# Patient Record
Sex: FEMALE | URBAN - METROPOLITAN AREA
[De-identification: names, ages, dates, MRNs, and addresses within clinical notes are randomized per-mention and may not be internally consistent; named-entity substitution may affect disease eponyms.]

---

## 2021-01-27 ENCOUNTER — HOSPITAL ENCOUNTER (OUTPATIENT)
Dept: LAB | Age: 46
Discharge: HOME | End: 2021-01-27

## 2021-01-27 PROCEDURE — 88305 TISSUE EXAM BY PATHOLOGIST: CPT

## 2023-10-23 ENCOUNTER — OFFICE VISIT (OUTPATIENT)
Dept: ORTHOPEDIC SURGERY | Age: 48
End: 2023-10-23
Payer: COMMERCIAL

## 2023-10-23 VITALS — BODY MASS INDEX: 23.73 KG/M2 | HEIGHT: 64 IN | WEIGHT: 139 LBS

## 2023-10-23 DIAGNOSIS — M75.21 BICIPITAL TENDINITIS OF RIGHT SHOULDER: ICD-10-CM

## 2023-10-23 DIAGNOSIS — M24.011 LOOSE BODY IN RIGHT SHOULDER: ICD-10-CM

## 2023-10-23 DIAGNOSIS — M19.011 OSTEOARTHRITIS OF RIGHT GLENOHUMERAL JOINT: Primary | ICD-10-CM

## 2023-10-23 DIAGNOSIS — S43.431A SUPERIOR GLENOID LABRUM LESION OF RIGHT SHOULDER, INITIAL ENCOUNTER: ICD-10-CM

## 2023-10-23 DIAGNOSIS — M19.011 DEGENERATIVE JOINT DISEASE OF RIGHT ACROMIOCLAVICULAR JOINT: ICD-10-CM

## 2023-10-23 PROCEDURE — 99204 OFFICE O/P NEW MOD 45 MIN: CPT | Performed by: ORTHOPAEDIC SURGERY

## 2023-10-23 RX ORDER — THYROID 30 MG/1
30 TABLET ORAL DAILY
COMMUNITY
Start: 2023-10-03

## 2023-10-23 NOTE — PROGRESS NOTES
Degener[facility] with increased signal seen on the T2 weighted images. No evident rotator cuff tear. There is fluid around the biceps tendon and labral degeneration generally without a paralabral cyst.  There is a shoulder effusion. There are degenerative changes in the glenohumeral joint. There is a loose body in the subcoracoid/subscapularis recess. Impression:   1. Osteoarthritis of right glenohumeral joint    2. Superior glenoid labrum lesion of right shoulder, initial encounter    3. Bicipital tendinitis of right shoulder    4. Loose body in right shoulder    5. Degenerative joint disease of right acromioclavicular joint       Left shoulder pain  Hypothyroidism    Plan:   I discussed the problem with the patient. I discussed nonoperative versus operative intervention including injections. Specifically today I discussed the treatment of early glenohumeral osteoarthritis in a young active patient. I discussed the potential need for comprehensive arthroscopic management of the right shoulder and ultimately potentially a right total shoulder arthroplasty. We will defer surgery for now. The patient has been in physical therapy. We had a lengthy discussion regarding a right shoulder subacromial injection. I believe that would really help her. I would recommend a right shoulder subacromial injection prior to considering operative intervention. I would put her back in supervised physical therapy after a right shoulder injection. We had a lengthy discussion. The patient would like to consider her options. We had a very lengthy discussion. She will advise us on how she wants to proceed. Otherwise I will recheck her back on an as-needed basis  4 This is a chronic illness/condition with exacerbation and progression    Follow up: Return if symptoms worsen or fail to improve.              Chris Tellez MD

## 2023-10-26 ENCOUNTER — TELEPHONE (OUTPATIENT)
Dept: ORTHOPEDIC SURGERY | Age: 48
End: 2023-10-26

## 2023-10-26 NOTE — TELEPHONE ENCOUNTER
Call pt  she  wants  to  ask if  there is a  medication that she can take  instead of  a  cortisone injection     And  also  she  wants  to know  if  Dr Soto Mcgee  can  speak  with  her  physcial  therapist

## 2023-10-30 NOTE — TELEPHONE ENCOUNTER
Called and spoke with the patient, informed patient a PT referral sent to [address] [place] [address] location, willa will be able to send progress notes to our office for Dr. Freya Espinosa to review to see her progress.

## 2023-11-08 ENCOUNTER — TELEPHONE (OUTPATIENT)
Dept: ORTHOPEDIC SURGERY | Age: 48
End: 2023-11-08

## 2023-11-08 NOTE — TELEPHONE ENCOUNTER
Pt  has  decided  she  does  want  a shoulder  injection    Next  week  she  is  pretty  open.   Monday  she  is free  tues after 10:30    Free on  Wednesday

## 2023-11-09 ENCOUNTER — TELEPHONE (OUTPATIENT)
Dept: ORTHOPEDIC SURGERY | Age: 48
End: 2023-11-09

## 2023-11-09 NOTE — TELEPHONE ENCOUNTER
I called and spoke to pt who had several questions about injection process. Pt also wanted to know about the timing of her injection. Pt will be out of town for Thanksgiving and is concerned about delaying therapy after injection. Will speak with AGP and call her back.

## 2023-11-20 ENCOUNTER — OFFICE VISIT (OUTPATIENT)
Dept: ORTHOPEDIC SURGERY | Age: 48
End: 2023-11-20
Payer: COMMERCIAL

## 2023-11-20 DIAGNOSIS — M24.011 LOOSE BODY IN RIGHT SHOULDER: ICD-10-CM

## 2023-11-20 DIAGNOSIS — S43.431A SUPERIOR GLENOID LABRUM LESION OF RIGHT SHOULDER, INITIAL ENCOUNTER: ICD-10-CM

## 2023-11-20 DIAGNOSIS — M19.011 OSTEOARTHRITIS OF RIGHT GLENOHUMERAL JOINT: Primary | ICD-10-CM

## 2023-11-20 DIAGNOSIS — M19.011 DEGENERATIVE JOINT DISEASE OF RIGHT ACROMIOCLAVICULAR JOINT: ICD-10-CM

## 2023-11-20 DIAGNOSIS — M75.21 BICIPITAL TENDINITIS OF RIGHT SHOULDER: ICD-10-CM

## 2023-11-20 PROCEDURE — 20610 DRAIN/INJ JOINT/BURSA W/O US: CPT | Performed by: ORTHOPAEDIC SURGERY

## 2023-11-20 RX ORDER — METHYLPREDNISOLONE ACETATE 80 MG/ML
80 INJECTION, SUSPENSION INTRA-ARTICULAR; INTRALESIONAL; INTRAMUSCULAR; SOFT TISSUE ONCE
Status: COMPLETED | OUTPATIENT
Start: 2023-11-20 | End: 2023-11-20

## 2023-11-20 RX ADMIN — METHYLPREDNISOLONE ACETATE 80 MG: 80 INJECTION, SUSPENSION INTRA-ARTICULAR; INTRALESIONAL; INTRAMUSCULAR; SOFT TISSUE at 09:12

## 2023-11-20 NOTE — PROGRESS NOTES
cuff tear. There is fluid around the biceps tendon and labral degeneration generally without a paralabral cyst.  There is a shoulder effusion. There are degenerative changes in the glenohumeral joint. There is a loose body in the subcoracoid/subscapularis recess. Minor procedure:      OFFICE PROCEDURE PROGRESS NOTE    Chart reviewed for the following:   Tahir Keene MD, have reviewed the History, Physical and updated the Allergic reactions for Laisha Durán performed immediately prior to start of procedure:   Tahir Keene MD, have performed the following reviews on [address] [place] [address] prior to the start of the procedure:            * Patient was identified by name and date of birth   * Agreement on procedure being performed was verified  * Risks and Benefits explained to the patient  * Procedure site verified and marked as necessary  * Patient was positioned for comfort     Time: 7:59 AM  Date of procedure: 11/20/2023  Procedure performed by:  Lorena Vargas MD    After sterile prep and drape of the right shoulder, the patient received a 9:1 injection into the subacromial space. It consisted of 4.5 mL of 2% Xylocaine, 4.5 mL of 0.5% bupivacaine and 80 mg of Depo-Medrol. A sterile dressing was applied. The patient tolerated the procedure well. Impression:   1. Osteoarthritis of right glenohumeral joint    2. Superior glenoid labrum lesion of right shoulder, initial encounter    3. Bicipital tendinitis of right shoulder    4. Loose body in right shoulder    5. Degenerative joint disease of right acromioclavicular joint       Left shoulder pain  Hypothyroidism    Plan:   I discussed the plan with the patient. We will continue physical therapy working on full motion and full strength. I will recheck her back in 2 months      Follow up: Return in about 2 months (around 1/20/2024).              Lorena Vargas MD

## 2023-11-24 NOTE — PROGRESS NOTES
Excessive fatigue, snoring. Rule out sleep disordered breathing. DDx include depression, narcolepsy but not suspicious of this. PLAN:  Home sleep test  Follow up after study       Orders Placed This Encounter   Procedures    Ambulatory Referral to Sleep Studies     Referral Priority:   Routine     Referral Type:   Consult for Advice and Opinion     Referral Reason:   Specialty Services Required     Number of Visits Requested:   1     No orders of the defined types were placed in this encounter. Collaborating Physician: Dr. Brian Castillo I spent at least 32 minutes with this established patient, and >50% of the time was spent counseling and/or coordinating care regarding LETICIA.     SALO Bradford CNP  Electronically signed

## 2023-11-27 ENCOUNTER — TELEMEDICINE (OUTPATIENT)
Dept: SLEEP MEDICINE | Age: 48
End: 2023-11-27
Payer: COMMERCIAL

## 2023-11-27 DIAGNOSIS — R06.83 SNORING: Primary | ICD-10-CM

## 2023-11-27 DIAGNOSIS — G47.8 NON-RESTORATIVE SLEEP: ICD-10-CM

## 2023-11-27 DIAGNOSIS — G47.10 HYPERSOMNIA: ICD-10-CM

## 2023-11-27 PROCEDURE — 99203 OFFICE O/P NEW LOW 30 MIN: CPT | Performed by: NURSE PRACTITIONER

## 2023-11-27 ASSESSMENT — SLEEP AND FATIGUE QUESTIONNAIRES
ESS TOTAL SCORE: 10
HOW LIKELY ARE YOU TO NOD OFF OR FALL ASLEEP WHEN YOU ARE A PASSENGER IN A CAR FOR AN HOUR WITHOUT A BREAK: 1
HOW LIKELY ARE YOU TO NOD OFF OR FALL ASLEEP WHILE SITTING QUIETLY AFTER LUNCH WITHOUT ALCOHOL: 2
HOW LIKELY ARE YOU TO NOD OFF OR FALL ASLEEP WHILE SITTING AND READING: 1
HOW LIKELY ARE YOU TO NOD OFF OR FALL ASLEEP WHILE WATCHING TV: 2
HOW LIKELY ARE YOU TO NOD OFF OR FALL ASLEEP WHILE SITTING INACTIVE IN A PUBLIC PLACE: 0
HOW LIKELY ARE YOU TO NOD OFF OR FALL ASLEEP IN A CAR, WHILE STOPPED FOR A FEW MINUTES IN TRAFFIC: 0
HOW LIKELY ARE YOU TO NOD OFF OR FALL ASLEEP WHILE LYING DOWN TO REST IN THE AFTERNOON WHEN CIRCUMSTANCES PERMIT: 3
HOW LIKELY ARE YOU TO NOD OFF OR FALL ASLEEP WHILE SITTING AND TALKING TO SOMEONE: 1

## 2023-11-27 NOTE — PATIENT INSTRUCTIONS
You will get a call from [facility] to schedule your at-home sleep study in the next 24-48 hours. This device will be mailed to you. It is a 2 night sleep study and once completed, you will return device to address provided. A physician will read the study and you will receive a call from our office with results or to schedule a follow-up appointment with the Sleep Center to discuss treatment options.

## 2023-12-20 ENCOUNTER — HOSPITAL ENCOUNTER (OUTPATIENT)
Dept: SLEEP CENTER | Age: 48
Discharge: HOME | End: 2023-12-23

## 2023-12-26 ENCOUNTER — TELEPHONE (OUTPATIENT)
Dept: SLEEP MEDICINE | Age: 48
End: 2023-12-26

## 2024-01-08 NOTE — PROGRESS NOTES
[facility]  [address]  [place] [address]  [phone]    Patient Name:  Laisha Garcia  YOB: 1975      Office Visit 1/9/2024    CHIEF COMPLAINT:    Chief Complaint   Patient presents with    New Patient    Sleep Study         HISTORY OF PRESENT ILLNESS:  Patient is a 49 yo  female seen today for follow up of sleep study results. Patient's HST revealed AHI of 5.4 including 5 central apneas, 11 obstructive apneas and 18 hypopneas. Lowest desaturation seen was 82% with SpO2 less than 89% for 1.3 minutes of the study.     Sleep study results reviewed with patient as well as pathophysiology and treatment options of sleep apnea.  She states she did wake up frequently during HST and had issues with device so likely her sleep study is underscored.  She is not rested in the mornings, does have daytime fatigue and will nap on occasion.  Current [place] score is 15/24.  She is falling asleep easily and denies any frequent nocturnal awakenings.  Of note she does have 2 sons who were diagnosed in their 20s with sleep apnea and have noticed significant improvement on CPAP.  She has also mentioned difficulty staying awake while driving.  I did confirm she is taking her L-tryptophan nightly rather than in the daytime.  Patient is agreeable to starting CPAP.    HST- 12/6/23                History reviewed. No pertinent past medical history.      There is no problem list on file for this patient.         History reviewed. No pertinent surgical history.        Social History     Socioeconomic History    Marital status:      Spouse name: Not on file    Number of children: Not on file    Years of education: Not on file    Highest education level: Not on file   Occupational History    Not on file   Tobacco Use    Smoking status: Never    Smokeless tobacco: Never   Substance and Sexual Activity    Alcohol use: Not on file    Drug use: Not on file    Sexual activity: Not

## 2024-01-09 ENCOUNTER — OFFICE VISIT (OUTPATIENT)
Dept: SLEEP MEDICINE | Age: 49
End: 2024-01-09
Payer: COMMERCIAL

## 2024-01-09 VITALS
HEIGHT: 64 IN | RESPIRATION RATE: 13 BRPM | BODY MASS INDEX: 24.48 KG/M2 | HEART RATE: 89 BPM | DIASTOLIC BLOOD PRESSURE: 76 MMHG | OXYGEN SATURATION: 99 % | WEIGHT: 143.4 LBS | SYSTOLIC BLOOD PRESSURE: 118 MMHG

## 2024-01-09 DIAGNOSIS — G47.8 NON-RESTORATIVE SLEEP: ICD-10-CM

## 2024-01-09 DIAGNOSIS — G47.33 OSA (OBSTRUCTIVE SLEEP APNEA): Primary | ICD-10-CM

## 2024-01-09 DIAGNOSIS — G47.34 NOCTURNAL HYPOXEMIA: ICD-10-CM

## 2024-01-09 DIAGNOSIS — G47.10 HYPERSOMNIA: ICD-10-CM

## 2024-01-09 PROCEDURE — 99214 OFFICE O/P EST MOD 30 MIN: CPT | Performed by: STUDENT IN AN ORGANIZED HEALTH CARE EDUCATION/TRAINING PROGRAM

## 2024-01-09 RX ORDER — OMEGA-3 FATTY ACIDS/FISH OIL 300-1000MG
CAPSULE ORAL
COMMUNITY

## 2024-01-09 ASSESSMENT — SLEEP AND FATIGUE QUESTIONNAIRES
ESS TOTAL SCORE: 15
HOW LIKELY ARE YOU TO NOD OFF OR FALL ASLEEP WHILE SITTING AND TALKING TO SOMEONE: 0
HOW LIKELY ARE YOU TO NOD OFF OR FALL ASLEEP WHILE SITTING INACTIVE IN A PUBLIC PLACE: 2
HOW LIKELY ARE YOU TO NOD OFF OR FALL ASLEEP IN A CAR, WHILE STOPPED FOR A FEW MINUTES IN TRAFFIC: 3
HOW LIKELY ARE YOU TO NOD OFF OR FALL ASLEEP WHEN YOU ARE A PASSENGER IN A CAR FOR AN HOUR WITHOUT A BREAK: 3
HOW LIKELY ARE YOU TO NOD OFF OR FALL ASLEEP WHILE SITTING AND READING: 3
HOW LIKELY ARE YOU TO NOD OFF OR FALL ASLEEP WHILE WATCHING TV: 1
HOW LIKELY ARE YOU TO NOD OFF OR FALL ASLEEP WHILE SITTING QUIETLY AFTER LUNCH WITHOUT ALCOHOL: 0
HOW LIKELY ARE YOU TO NOD OFF OR FALL ASLEEP WHILE LYING DOWN TO REST IN THE AFTERNOON WHEN CIRCUMSTANCES PERMIT: 3

## 2024-01-09 NOTE — PATIENT INSTRUCTIONS
The company who will be taking care of your CPAP supplies is:    Address: [address], [place] [address]  Phone: [phone] Option #1  Fax: [phone]     Patient Education        Sleep Apnea: Care Instructions  Overview     Sleep apnea means that you frequently stop breathing for 10 seconds or longer during sleep. It can be mild to severe, based on the number of times an hour that you stop breathing.  Blocked or narrowed airways in your nose, mouth, or throat can cause sleep apnea. Your airway can become blocked when your throat muscles and tongue relax during sleep.  You can help treat sleep apnea at home by making lifestyle changes. You also can use a CPAP breathing machine that keeps tissues in the throat from blocking your airway. You may use a mouth or nose device while you sleep to help keep your airway open. Surgery may be an option for some people. Surgery may be done to implant a nerve stimulation device in the chest that helps keep the airway open. Surgery can also be done to remove enlarged tissues that are blocking the throat.  Follow-up care is a key part of your treatment and safety. Be sure to make and go to all appointments, and call your doctor if you are having problems. It's also a good idea to know your test results and keep a list of the medicines you take.  How can you care for yourself at home?  Lose weight, if needed.  Sleep on your side. It may help mild apnea.  Avoid alcohol and medicines such as sleeping pills, opioids, or sedatives before bed.  Don't smoke. If you need help quitting, talk to your doctor.  Prop up the head of your bed.  Treat breathing problems, such as a stuffy nose, that are caused by a cold or allergies.  Try a continuous positive airway pressure (CPAP) breathing machine if your doctor recommends it.  If CPAP doesn't work for you, ask your doctor if you can try other masks, settings, or breathing machines.  Try oral breathing devices or other nasal

## 2024-01-23 ENCOUNTER — OFFICE VISIT (OUTPATIENT)
Dept: ORTHOPEDIC SURGERY | Age: 49
End: 2024-01-23
Payer: COMMERCIAL

## 2024-01-23 DIAGNOSIS — S43.431A SUPERIOR GLENOID LABRUM LESION OF RIGHT SHOULDER, INITIAL ENCOUNTER: ICD-10-CM

## 2024-01-23 DIAGNOSIS — M75.21 BICIPITAL TENDINITIS OF RIGHT SHOULDER: ICD-10-CM

## 2024-01-23 DIAGNOSIS — M24.011 LOOSE BODY IN RIGHT SHOULDER: ICD-10-CM

## 2024-01-23 DIAGNOSIS — M19.011 DEGENERATIVE JOINT DISEASE OF RIGHT ACROMIOCLAVICULAR JOINT: ICD-10-CM

## 2024-01-23 DIAGNOSIS — M19.011 OSTEOARTHRITIS OF RIGHT GLENOHUMERAL JOINT: Primary | ICD-10-CM

## 2024-01-23 PROCEDURE — 99212 OFFICE O/P EST SF 10 MIN: CPT | Performed by: ORTHOPAEDIC SURGERY

## 2024-01-23 NOTE — PROGRESS NOTES
Name: Laisha Garcia  YOB: 1975  Gender: female  MRN: [id number]          HPI: Laisha Garcia is a 48 y.o. right-hand-dominant female seen at the request of Galen Romo PT right shoulder problems.  She has a history of hypothyroidism.  She notes a long history of bilateral shoulder pain.  She describes what appears to be evidence of instability with possible subluxation.  She was always able to accommodate.  The left shoulder does not bother her too much.  In early July she was pushing herself up in bed when she felt a pop and the onset of right shoulder pain.  She initially had x-[facility] at [facility].  She for an MRI of the right shoulder on 9/25/2023.  She saw Dr. Joe Avalos on 10/4/2023.  Dr. Avalos suggested possible right shoulder surgery.  She is here for second opinion.  She denies any numbness or tingling.  She denies any definitive dislocation.  The left shoulder is not too bad.  The right shoulder is sore painful and stiff.  She is sleeping at night.  She has been in physical therapy.  I injected her right shoulder on the last visit.  She is doing much better.  She has minimal discomfort at end range of motion    ROS/Meds/PSH/PMH/FH/SH: A ten system review of systems was performed and is negative other than what is in the HPI.   Tobacco:  reports that she has never smoked. She has never used smokeless tobacco.  There were no vitals taken for this visit.     Physical Examination:  She is an awake alert pleasant female ambulating without difficulty  She has a full range of cervical spine motion without radicular findings    The left shoulder has 0 to 180 degrees of active and 0 to 180 degrees passive forward elevation.   Mild overhead pain  Internal rotation is to T6.  External rotation is to 60 degrees at the side.   In the 90 degree abducted position 90 degrees of external and 90 degrees internal rotation  The AC joint is non-tender  SC joint is non-tender.

## 2024-04-01 NOTE — PROGRESS NOTES
[facility]  [address]  [place] [address]  [phone]    Patient Name:  Laisha Garcia  YOB: 1975      Office Visit 4/4/2024    CHIEF COMPLAINT:    Chief Complaint   Patient presents with    Sleep Apnea         HISTORY OF PRESENT ILLNESS:  Patient is a 47 yo  female seen today for follow up of sleep apnea. Patient's sleep study in December 2023 revealed AHI of 5.4 with lowest desaturation 82%. She is prescribed cpap therapy with a humidifier set at 6-10cm with a full face mask. Most recent download reveals AHI on PAP therapy is 2.0, leak is 15.6 and the hourly usage is 6 hours 15 minutes nightly. The overall use is 437 hours with days greater than four hours at 66/73. The patient is compliant with the Pap therapy and is feeling better as a result.      This is patient's first visit since starting CPAP.  She is falling asleep easily and denies any frequent nocturnal awakenings.  She is somewhat rested in the morning, does still have some daytime fatigue but is not napping as often.  Current [place] score is 12/24.  She states recently she was on a 4-hour drive late at night and was able to stay awake.  She states later that evening she did fall asleep without CPAP and the following day had excessive fatigue.  She also mentions that in her teens/20s she had excessive fatigue and she is concerned about potential narcolepsy.  She denies any cataplexy symptoms.  We did discuss that if she is still having some hypersomnia we could discuss starting a stimulant however if she wishes to pursue a narcolepsy diagnosis we will need to change her pressure to a set pressure for 30 days prior to ordering the study.    Download        [place] Sleepiness Scale      4/4/2024     1:05 PM 1/9/2024     9:11 AM 11/27/2023     8:39 AM   Sleep Medicine   Sitting and reading 2 3 1   Watching TV 1 1 2   Sitting, inactive in a public place (e.g. a theatre or a meeting) 0 2 0   As a

## 2024-04-04 ENCOUNTER — OFFICE VISIT (OUTPATIENT)
Dept: SLEEP MEDICINE | Age: 49
End: 2024-04-04
Payer: COMMERCIAL

## 2024-04-04 VITALS
RESPIRATION RATE: 17 BRPM | SYSTOLIC BLOOD PRESSURE: 112 MMHG | OXYGEN SATURATION: 100 % | BODY MASS INDEX: 24.41 KG/M2 | DIASTOLIC BLOOD PRESSURE: 72 MMHG | WEIGHT: 143 LBS | HEIGHT: 64 IN | HEART RATE: 84 BPM

## 2024-04-04 DIAGNOSIS — G47.33 OSA (OBSTRUCTIVE SLEEP APNEA): Primary | ICD-10-CM

## 2024-04-04 DIAGNOSIS — G47.34 NOCTURNAL HYPOXEMIA: ICD-10-CM

## 2024-04-04 DIAGNOSIS — G47.8 NON-RESTORATIVE SLEEP: ICD-10-CM

## 2024-04-04 DIAGNOSIS — G47.10 HYPERSOMNIA: ICD-10-CM

## 2024-04-04 PROCEDURE — 99213 OFFICE O/P EST LOW 20 MIN: CPT | Performed by: STUDENT IN AN ORGANIZED HEALTH CARE EDUCATION/TRAINING PROGRAM

## 2024-04-04 ASSESSMENT — SLEEP AND FATIGUE QUESTIONNAIRES
HOW LIKELY ARE YOU TO NOD OFF OR FALL ASLEEP WHILE SITTING AND TALKING TO SOMEONE: SLIGHT CHANCE OF DOZING
HOW LIKELY ARE YOU TO NOD OFF OR FALL ASLEEP WHILE SITTING QUIETLY AFTER LUNCH WITHOUT ALCOHOL: MODERATE CHANCE OF DOZING
HOW LIKELY ARE YOU TO NOD OFF OR FALL ASLEEP WHEN YOU ARE A PASSENGER IN A CAR FOR AN HOUR WITHOUT A BREAK: MODERATE CHANCE OF DOZING
HOW LIKELY ARE YOU TO NOD OFF OR FALL ASLEEP WHILE LYING DOWN TO REST IN THE AFTERNOON WHEN CIRCUMSTANCES PERMIT: HIGH CHANCE OF DOZING
HOW LIKELY ARE YOU TO NOD OFF OR FALL ASLEEP IN A CAR, WHILE STOPPED FOR A FEW MINUTES IN TRAFFIC: SLIGHT CHANCE OF DOZING
ESS TOTAL SCORE: 12
HOW LIKELY ARE YOU TO NOD OFF OR FALL ASLEEP WHILE SITTING INACTIVE IN A PUBLIC PLACE: WOULD NEVER DOZE
HOW LIKELY ARE YOU TO NOD OFF OR FALL ASLEEP WHILE SITTING AND READING: MODERATE CHANCE OF DOZING
HOW LIKELY ARE YOU TO NOD OFF OR FALL ASLEEP WHILE WATCHING TV: SLIGHT CHANCE OF DOZING

## 2024-10-15 NOTE — PROGRESS NOTES
[facility]  [address]  [place] [address]  [phone]    Patient Name:  Laisha Garcia  YOB: 1975    Laisha Garcia, was evaluated through a synchronous (real-time) audio-video encounter. The patient (or guardian if applicable) is aware that this is a billable service, which includes applicable co-pays. This Virtual Visit was conducted with patient's (and/or legal guardian's) consent. Patient identification was verified, and a caregiver was present when appropriate.   The patient was located at Other: in [place], [place]   Provider was located at Home (Appt Dept State): [place]  Confirm you are appropriately licensed, registered, or certified to deliver care in the state where the patient is located as indicated above. If you are not or unsure, please re-schedule the visit: Yes, I confirm.          --SALO Mahoney - CNP on 10/17/2024 at 10:23 AM             Office Visit 10/17/2024    CHIEF COMPLAINT:    Chief Complaint   Patient presents with    CPAP/BiPAP    Sleep Apnea       HISTORY OF PRESENT ILLNESS:  Patient is being seen today via virtual visit.     Patient had a home sleep study 12/6/2023 with AHI 5.4 events per hour with desaturations to 82%. She is prescribed auto CPAP 6 to 10 cm.  Download reveals 90% compliance over the last 194 days with average nightly use 6 hours and 16 minutes.  AHI is 1.9 events per hour with average pressure use 7.6 to 9.8 cm.    She states that for the past few weeks, she has had a harder time to get going in the mornings but unsure if its related to cpap. She also was out of power for a week and was unable to use her machine.     She is using a full face mask and tolerates it better than the nasal. She tried the nasal but kept opening her mouth. She denies any problems with pressure.  She denies any significant medical changes. No LE edema edema. No weight changes. She states that usually she is not sleepy during the

## 2024-10-16 ASSESSMENT — SLEEP AND FATIGUE QUESTIONNAIRES
HOW LIKELY ARE YOU TO NOD OFF OR FALL ASLEEP WHILE SITTING INACTIVE IN A PUBLIC PLACE: SLIGHT CHANCE OF DOZING
HOW LIKELY ARE YOU TO NOD OFF OR FALL ASLEEP WHILE LYING DOWN TO REST IN THE AFTERNOON WHEN CIRCUMSTANCES PERMIT: HIGH CHANCE OF DOZING
HOW LIKELY ARE YOU TO NOD OFF OR FALL ASLEEP WHILE SITTING AND READING: MODERATE CHANCE OF DOZING
HOW LIKELY ARE YOU TO NOD OFF OR FALL ASLEEP IN A CAR, WHILE STOPPED FOR A FEW MINUTES IN TRAFFIC: SLIGHT CHANCE OF DOZING
HOW LIKELY ARE YOU TO NOD OFF OR FALL ASLEEP WHEN YOU ARE A PASSENGER IN A CAR FOR AN HOUR WITHOUT A BREAK: MODERATE CHANCE OF DOZING
HOW LIKELY ARE YOU TO NOD OFF OR FALL ASLEEP WHILE SITTING AND READING: MODERATE CHANCE OF DOZING
HOW LIKELY ARE YOU TO NOD OFF OR FALL ASLEEP WHILE SITTING AND TALKING TO SOMEONE: SLIGHT CHANCE OF DOZING
ESS TOTAL SCORE: 13
HOW LIKELY ARE YOU TO NOD OFF OR FALL ASLEEP WHILE WATCHING TV: SLIGHT CHANCE OF DOZING
HOW LIKELY ARE YOU TO NOD OFF OR FALL ASLEEP IN A CAR, WHILE STOPPED FOR A FEW MINUTES IN TRAFFIC: SLIGHT CHANCE OF DOZING
HOW LIKELY ARE YOU TO NOD OFF OR FALL ASLEEP WHILE SITTING QUIETLY AFTER LUNCH WITHOUT ALCOHOL: MODERATE CHANCE OF DOZING
HOW LIKELY ARE YOU TO NOD OFF OR FALL ASLEEP WHILE LYING DOWN TO REST IN THE AFTERNOON WHEN CIRCUMSTANCES PERMIT: HIGH CHANCE OF DOZING
HOW LIKELY ARE YOU TO NOD OFF OR FALL ASLEEP WHILE WATCHING TV: SLIGHT CHANCE OF DOZING
HOW LIKELY ARE YOU TO NOD OFF OR FALL ASLEEP WHILE SITTING INACTIVE IN A PUBLIC PLACE: SLIGHT CHANCE OF DOZING
HOW LIKELY ARE YOU TO NOD OFF OR FALL ASLEEP WHILE SITTING AND TALKING TO SOMEONE: SLIGHT CHANCE OF DOZING
HOW LIKELY ARE YOU TO NOD OFF OR FALL ASLEEP WHILE SITTING QUIETLY AFTER LUNCH WITHOUT ALCOHOL: MODERATE CHANCE OF DOZING
HOW LIKELY ARE YOU TO NOD OFF OR FALL ASLEEP WHEN YOU ARE A PASSENGER IN A CAR FOR AN HOUR WITHOUT A BREAK: MODERATE CHANCE OF DOZING

## 2024-10-17 ENCOUNTER — TELEMEDICINE (OUTPATIENT)
Dept: SLEEP MEDICINE | Age: 49
End: 2024-10-17
Payer: COMMERCIAL

## 2024-10-17 DIAGNOSIS — G47.33 OSA (OBSTRUCTIVE SLEEP APNEA): Primary | ICD-10-CM

## 2024-10-17 DIAGNOSIS — G47.34 NOCTURNAL HYPOXEMIA: ICD-10-CM

## 2024-10-17 DIAGNOSIS — G47.10 HYPERSOMNIA: ICD-10-CM

## 2024-10-17 PROCEDURE — 99213 OFFICE O/P EST LOW 20 MIN: CPT | Performed by: NURSE PRACTITIONER

## 2025-02-26 ENCOUNTER — TELEPHONE (OUTPATIENT)
Dept: SLEEP MEDICINE | Age: 50
End: 2025-02-26

## 2025-02-26 NOTE — TELEPHONE ENCOUNTER
Patient called and stated she needed a copy of her cpap order. I sent patient a xTV message with her order and called her to let her know its been sent.

## 2025-04-23 ASSESSMENT — SLEEP AND FATIGUE QUESTIONNAIRES
HOW LIKELY ARE YOU TO NOD OFF OR FALL ASLEEP WHILE LYING DOWN TO REST IN THE AFTERNOON WHEN CIRCUMSTANCES PERMIT: HIGH CHANCE OF DOZING
ESS TOTAL SCORE: 10
HOW LIKELY ARE YOU TO NOD OFF OR FALL ASLEEP WHILE LYING DOWN TO REST IN THE AFTERNOON WHEN CIRCUMSTANCES PERMIT: HIGH CHANCE OF DOZING
HOW LIKELY ARE YOU TO NOD OFF OR FALL ASLEEP WHILE WATCHING TV: SLIGHT CHANCE OF DOZING
HOW LIKELY ARE YOU TO NOD OFF OR FALL ASLEEP WHILE SITTING INACTIVE IN A PUBLIC PLACE: SLIGHT CHANCE OF DOZING
HOW LIKELY ARE YOU TO NOD OFF OR FALL ASLEEP IN A CAR, WHILE STOPPED FOR A FEW MINUTES IN TRAFFIC: SLIGHT CHANCE OF DOZING
HOW LIKELY ARE YOU TO NOD OFF OR FALL ASLEEP WHILE SITTING QUIETLY AFTER LUNCH WITHOUT ALCOHOL: SLIGHT CHANCE OF DOZING
HOW LIKELY ARE YOU TO NOD OFF OR FALL ASLEEP WHILE SITTING QUIETLY AFTER LUNCH WITHOUT ALCOHOL: SLIGHT CHANCE OF DOZING
HOW LIKELY ARE YOU TO NOD OFF OR FALL ASLEEP WHILE SITTING AND TALKING TO SOMEONE: WOULD NEVER DOZE
HOW LIKELY ARE YOU TO NOD OFF OR FALL ASLEEP WHILE SITTING AND READING: MODERATE CHANCE OF DOZING
HOW LIKELY ARE YOU TO NOD OFF OR FALL ASLEEP WHEN YOU ARE A PASSENGER IN A CAR FOR AN HOUR WITHOUT A BREAK: SLIGHT CHANCE OF DOZING
HOW LIKELY ARE YOU TO NOD OFF OR FALL ASLEEP IN A CAR, WHILE STOPPED FOR A FEW MINUTES IN TRAFFIC: SLIGHT CHANCE OF DOZING
HOW LIKELY ARE YOU TO NOD OFF OR FALL ASLEEP WHILE SITTING AND TALKING TO SOMEONE: WOULD NEVER DOZE
HOW LIKELY ARE YOU TO NOD OFF OR FALL ASLEEP WHILE WATCHING TV: SLIGHT CHANCE OF DOZING
HOW LIKELY ARE YOU TO NOD OFF OR FALL ASLEEP WHEN YOU ARE A PASSENGER IN A CAR FOR AN HOUR WITHOUT A BREAK: SLIGHT CHANCE OF DOZING
HOW LIKELY ARE YOU TO NOD OFF OR FALL ASLEEP WHILE SITTING AND READING: MODERATE CHANCE OF DOZING
HOW LIKELY ARE YOU TO NOD OFF OR FALL ASLEEP WHILE SITTING INACTIVE IN A PUBLIC PLACE: SLIGHT CHANCE OF DOZING

## 2025-04-23 NOTE — PROGRESS NOTES
Constanza  Change pressure to apap 6-9 cm    [facility]  Phone: [address]  [facility] [address]  Dept: [phone]      Patient Name: Laisha Garcia  : 1975  Gender: female  Address: [address] [place] [address]  Patient phone: [phone] (home)       Primary Insurance: Payor: [facility] / Plan: [facility] STATE / Product Type: *No Product type* /   Subscriber ID: [id number] - ([facility])      AMB Supply Order  Order Details     DME Location:   Order Date: 2025   The primary encounter diagnosis was LETICIA (obstructive sleep apnea). Diagnoses of Nocturnal hypoxemia and Hypersomnia were also pertinent to this visit.             (  X   )Supplies Needed       apap Machine   (     ) CPAP Unit  (     ) Auto CPAP Unit  (     ) BiLevel Unit  (     ) Auto BiLevel Unit  (     ) ASV   (     ) Bilevel ST      Length of need: 12 months    Pressure:  6-9 cmH20  EPR:      Starting Ramp Pressure:   cm H20  Ramp Time: min      Patient had a diagnostic Apnea Hypopnea Index (AHI) of :    *SUPPLIES* Replace all as needed, or per coverage guidelines     Masks Type:  ( x   ) -Full Face Mask (1 per 3 mon)  (  x  ) -Full Mask (1 per month) Interface/Cushion      (  ) -Nasal Mask (1 per 3 mon)  (  ) - Nasal Mask (1 per month) Interface/Cushion  (     ) -Pillow (2 per mon)  (     ) -Ntewterlv (1 per 6 mon)            Other Supplies:    (   X  )-Bczevsjl (1 per 6 mon)  (   X  )-Tlovlu Tubing (1 per 3 mon)  (   X  )- Disposable Filter (2 per mon)  (   x  )-Ulmyrn Humidifier (1 per year)     ( x    )-Bddnqwcnr (sometimes used with Full Face Mask) (1 per 6 mos)  (    )-Tubing-without heat (1 per 3 mos)  (     )-Non-Disposable Filter (1 per 6 mos)  (  x   )-Water Chamber (1 per 6 mos)  (     )-Humidifier non-heated (1 per 5 yrs)      Signed Date: 2025  Electronically

## 2025-04-24 ENCOUNTER — OFFICE VISIT (OUTPATIENT)
Dept: SLEEP MEDICINE | Age: 50
End: 2025-04-24
Payer: COMMERCIAL

## 2025-04-24 VITALS
HEART RATE: 65 BPM | SYSTOLIC BLOOD PRESSURE: 104 MMHG | WEIGHT: 139.4 LBS | DIASTOLIC BLOOD PRESSURE: 67 MMHG | TEMPERATURE: 97.3 F | RESPIRATION RATE: 16 BRPM | BODY MASS INDEX: 23.8 KG/M2 | OXYGEN SATURATION: 95 % | HEIGHT: 64 IN

## 2025-04-24 DIAGNOSIS — G47.33 OSA (OBSTRUCTIVE SLEEP APNEA): Primary | ICD-10-CM

## 2025-04-24 DIAGNOSIS — G47.10 HYPERSOMNIA: ICD-10-CM

## 2025-04-24 DIAGNOSIS — G47.34 NOCTURNAL HYPOXEMIA: ICD-10-CM

## 2025-04-24 DIAGNOSIS — F45.8 AEROPHAGIA: ICD-10-CM

## 2025-04-24 PROCEDURE — 99214 OFFICE O/P EST MOD 30 MIN: CPT | Performed by: NURSE PRACTITIONER
